# Patient Record
Sex: FEMALE | Race: OTHER | HISPANIC OR LATINO | Employment: FULL TIME | URBAN - METROPOLITAN AREA
[De-identification: names, ages, dates, MRNs, and addresses within clinical notes are randomized per-mention and may not be internally consistent; named-entity substitution may affect disease eponyms.]

---

## 2024-09-17 ENCOUNTER — OFFICE VISIT (OUTPATIENT)
Dept: URGENT CARE | Facility: CLINIC | Age: 39
End: 2024-09-17
Payer: COMMERCIAL

## 2024-09-17 VITALS
HEART RATE: 61 BPM | BODY MASS INDEX: 22.97 KG/M2 | SYSTOLIC BLOOD PRESSURE: 123 MMHG | RESPIRATION RATE: 18 BRPM | OXYGEN SATURATION: 98 % | TEMPERATURE: 98 F | DIASTOLIC BLOOD PRESSURE: 77 MMHG | WEIGHT: 117 LBS | HEIGHT: 60 IN

## 2024-09-17 DIAGNOSIS — S93.509A SPRAIN OF TOE, INITIAL ENCOUNTER: Primary | ICD-10-CM

## 2024-09-17 PROCEDURE — 99213 OFFICE O/P EST LOW 20 MIN: CPT | Mod: S$GLB,,, | Performed by: INTERNAL MEDICINE

## 2024-09-17 RX ORDER — HYDROQUINONE 40 MG/G
CREAM TOPICAL 2 TIMES DAILY
COMMUNITY
Start: 2024-07-17

## 2024-09-17 NOTE — PROGRESS NOTES
Subjective:      Patient ID: Elizabeth Cruz is a 38 y.o. female.    Vitals:  height is 5' (1.524 m) and weight is 53.1 kg (117 lb). Her oral temperature is 98.2 °F (36.8 °C). Her blood pressure is 123/77 and her pulse is 61. Her respiration is 18 and oxygen saturation is 98%.     Chief Complaint: Toe Injury    Patient is here for left index toe injury   Patient was walking when toe was bend the wrong way    Toe Injury  This is a new problem. The current episode started yesterday. The problem occurs constantly. The problem has been unchanged. Pertinent negatives include no abdominal pain, anorexia, arthralgias, change in bowel habit, chest pain, chills, congestion, coughing, diaphoresis, fatigue, fever, headaches, joint swelling, myalgias, nausea, neck pain, numbness, rash, sore throat, swollen glands, urinary symptoms, vertigo, visual change, vomiting or weakness. The symptoms are aggravated by walking and standing. She has tried nothing for the symptoms. The treatment provided no relief.       Constitution: Negative for chills, sweating, fatigue and fever.   HENT:  Negative for congestion and sore throat.    Neck: Negative for neck pain.   Cardiovascular:  Negative for chest pain.   Respiratory:  Negative for cough.    Gastrointestinal:  Negative for abdominal pain, nausea and vomiting.   Musculoskeletal:  Negative for joint pain, joint swelling and muscle ache.   Skin:  Negative for rash.   Neurological:  Negative for history of vertigo, headaches and numbness.      Objective:     Physical Exam    Left index toe with some pain with extension. No swelling, no deformity. Pulses and sensation normal.   Assessment:     1. Sprain of toe, initial encounter        Plan:       Sprain of toe, initial encounter  -     X-Ray Toe 2 View; Future; Expected date: 09/17/2024  -     Walking Boot For Home Use

## 2024-09-18 ENCOUNTER — TELEPHONE (OUTPATIENT)
Dept: URGENT CARE | Facility: CLINIC | Age: 39
End: 2024-09-18
Payer: COMMERCIAL

## 2024-09-18 NOTE — TELEPHONE ENCOUNTER
Patient called to find out x-ray resylts. Discussed negative fracture; treat as sprain of left 2nd toe with RICE. Recommend to es tape toe.  Recommend OTC ibuprofen 600 to 800 mg every 6 to 8 hours prn  pain.  Pt declined prescription.Pt states it hurts when she walks. She is using a walking boot.               X-Ray Toe 2 View    Result Date: 9/17/2024  EXAMINATION: XR TOE 2 VIEW CLINICAL HISTORY: Left second toe pain;  Unspecified sprain of unspecified toe(s), initial encounter TECHNIQUE: AP, lateral, oblique radiographs of the left 2nd toe. COMPARISON: None FINDINGS: There is no acute fracture or dislocation.  Alignment is normal.  Joint spaces are preserved.  Soft tissues are unremarkable.     No acute osseous abnormality. Electronically signed by: Conrad Bhatt Date:    09/17/2024 Time:    19:41

## 2024-10-07 ENCOUNTER — OFFICE VISIT (OUTPATIENT)
Dept: URGENT CARE | Facility: CLINIC | Age: 39
End: 2024-10-07
Payer: COMMERCIAL

## 2024-10-07 VITALS
OXYGEN SATURATION: 97 % | HEART RATE: 79 BPM | HEIGHT: 60 IN | TEMPERATURE: 98 F | RESPIRATION RATE: 18 BRPM | SYSTOLIC BLOOD PRESSURE: 100 MMHG | DIASTOLIC BLOOD PRESSURE: 68 MMHG | WEIGHT: 119.06 LBS | BODY MASS INDEX: 23.37 KG/M2

## 2024-10-07 DIAGNOSIS — M54.50 ACUTE BILATERAL LOW BACK PAIN WITHOUT SCIATICA: ICD-10-CM

## 2024-10-07 DIAGNOSIS — R53.83 FATIGUE, UNSPECIFIED TYPE: ICD-10-CM

## 2024-10-07 DIAGNOSIS — R50.9 FEVER, UNSPECIFIED FEVER CAUSE: ICD-10-CM

## 2024-10-07 DIAGNOSIS — R09.81 NASAL CONGESTION: ICD-10-CM

## 2024-10-07 DIAGNOSIS — J01.90 ACUTE BACTERIAL RHINOSINUSITIS: Primary | ICD-10-CM

## 2024-10-07 DIAGNOSIS — B96.89 ACUTE BACTERIAL RHINOSINUSITIS: Primary | ICD-10-CM

## 2024-10-07 LAB
BILIRUBIN, UA POC OHS: ABNORMAL
BLOOD, UA POC OHS: ABNORMAL
CLARITY, UA POC OHS: CLEAR
COLOR, UA POC OHS: YELLOW
CTP QC/QA: YES
CTP QC/QA: YES
GLUCOSE, UA POC OHS: NEGATIVE
KETONES, UA POC OHS: 15
LEUKOCYTES, UA POC OHS: NEGATIVE
NITRITE, UA POC OHS: NEGATIVE
PH, UA POC OHS: 5.5
POC MOLECULAR INFLUENZA A AGN: NEGATIVE
POC MOLECULAR INFLUENZA B AGN: NEGATIVE
PROTEIN, UA POC OHS: NEGATIVE
SARS-COV-2 AG RESP QL IA.RAPID: NEGATIVE
SPECIFIC GRAVITY, UA POC OHS: 1.02
UROBILINOGEN, UA POC OHS: 0.2

## 2024-10-07 PROCEDURE — 87502 INFLUENZA DNA AMP PROBE: CPT | Mod: QW,S$GLB,,

## 2024-10-07 PROCEDURE — 81003 URINALYSIS AUTO W/O SCOPE: CPT | Mod: QW,S$GLB,,

## 2024-10-07 PROCEDURE — 99214 OFFICE O/P EST MOD 30 MIN: CPT | Mod: S$GLB,,,

## 2024-10-07 PROCEDURE — 87811 SARS-COV-2 COVID19 W/OPTIC: CPT | Mod: QW,S$GLB,,

## 2024-10-07 RX ORDER — AMOXICILLIN AND CLAVULANATE POTASSIUM 875; 125 MG/1; MG/1
1 TABLET, FILM COATED ORAL EVERY 12 HOURS
Qty: 14 TABLET | Refills: 0 | Status: SHIPPED | OUTPATIENT
Start: 2024-10-07 | End: 2024-10-14

## 2024-10-07 RX ORDER — LEVOCETIRIZINE DIHYDROCHLORIDE 5 MG/1
5 TABLET, FILM COATED ORAL NIGHTLY
Qty: 30 TABLET | Refills: 0 | Status: SHIPPED | OUTPATIENT
Start: 2024-10-07

## 2024-10-07 RX ORDER — FLUTICASONE PROPIONATE 50 MCG
1 SPRAY, SUSPENSION (ML) NASAL DAILY
Qty: 11.1 ML | Refills: 0 | Status: SHIPPED | OUTPATIENT
Start: 2024-10-07

## 2024-10-07 NOTE — PATIENT INSTRUCTIONS
Augmentin twice daily for 7 days.  Flonase and Xyzal daily for your sinus.  Increase your fluid intake.    ¿Qué cuidados se necesitan en casa?   Llame a cook médico habitual para comunicarle que estuvo en el servicio de emergencias. Programe lissa polo de seguimiento si así se lo pidieron.  Intente aligerar la mucosidad.  Anable muchos líquidos para mantenerse hidratado.  Use un humidificador de aire húmedo para evitar el aire seco.  Aplique gotas nasales tran o lissa solución nasal salina para aliviar la congestión.  Lávese las joselo con frecuencia. Nesco ayudará a mantener saludables a los demás.  No fume o esté en lugares con humo. Dia cosas que podrían causarle problemas respiratorios fabby los gases, la contaminación, el polvo y otros alérgenos comunes.  Puede mauricio medicamentos fabby ibuprofeno, naproxeno o paracetamol para ayudarlo a aliviar el dolor.  Call for Emergency Help   Regrese al servicio de emergencias si:   Tiene rigidez en el shon, especialmente si también tiene fiebre, escalofríos, vómitos o dolor de zita intenso.  Tiene problemas para pensar con claridad.  Tiene problemas para reid o tiene visión doble.  Tiene hinchazón, enrojecimiento o dolor alrededor de elsa o ambos ojos.  Tiene fiebre de 38,9 °C (102 °F) o más, escalofríos con temblor o sudores.  ¿Cuándo jeremy llamar al médico?   Tiene malestar estomacal y vómitos.  Tiene más dolor en la alonzo y la zita.  No mejora en 1 o 2 semanas.  Tiene síntomas nuevos o que empeoran.  - Rest.    - Drink plenty of fluids.    - Acetaminophen (tylenol) or Ibuprofen (advil,motrin) as directed as needed for fever/pain. Avoid tylenol if you have a history of liver disease. Do not take ibuprofen if you have a history of GI bleeding, kidney disease, or if you take blood thinners.     - Follow up with your PCP or specialty clinic as directed in the next 1-2 weeks if not improved or as needed.  You can call [phone] to schedule an appointment with the  appropriate provider.    - Go to the ER or seek medical attention immediately if you develop new or worsening symptoms.     - You must understand that you have received an Urgent Care treatment only and that you may be released before all of your medical problems are known or treated.   - You, the patient, will arrange for follow up care as instructed.   - If your condition worsens or fails to improve we recommend that you receive another evaluation at the ER immediately or contact your PCP to discuss your concerns or return here.

## 2024-10-07 NOTE — PROGRESS NOTES
Subjective:      Patient ID: Elizabeth Cruz is a 38 y.o. female.    Vitals:  height is 5' (1.524 m) and weight is 54 kg (119 lb 0.8 oz). Her oral temperature is 97.9 °F (36.6 °C). Her blood pressure is 100/68 and her pulse is 79. Her respiration is 18 and oxygen saturation is 97%.     Chief Complaint: Generalized Body Aches    38-year-old female patient presents with chills, body aches, nasal congestion, fever, sinus pain, pressure x4 days.  She states she has been having a fever every day but resolves after taking advil. Denies CP, SOB, GI complaints, or any other associated symptoms.         Fever   This is a new problem. The current episode started 3 days ago. The problem occurs cycles. The problem has been unchanged. She has not experienced a heat injury.Her temperature was unmeasured prior to arrival. Associated symptoms include congestion, headaches and muscle aches. Pertinent negatives include no abdominal pain, chest pain, coughing, ear pain, nausea, rash, sore throat, vomiting or wheezing.       Constitution: Positive for chills and fever. Negative for activity change, appetite change, sweating, fatigue and unexpected weight change.   HENT:  Positive for congestion, sinus pain and sinus pressure. Negative for ear pain, ear discharge, foreign body in ear, tinnitus, hearing loss, facial swelling, facial trauma, nosebleeds, foreign body in nose, postnasal drip, sore throat, trouble swallowing and voice change.    Neck: Negative for neck pain, neck stiffness and painful lymph nodes.   Cardiovascular:  Negative for chest trauma, chest pain, leg swelling and palpitations.   Eyes:  Negative for eye trauma, foreign body in eye, eye discharge, eye itching, eye pain and eye redness.   Respiratory:  Negative for sleep apnea, chest tightness, cough, sputum production, bloody sputum, COPD, shortness of breath, stridor, wheezing and asthma.    Gastrointestinal:  Negative for abdominal trauma, abdominal pain,  abdominal bloating, history of abdominal surgery, nausea, vomiting and constipation.   Endocrine: hair loss, cold intolerance, heat intolerance and excessive thirst.   Genitourinary:  Negative for dysuria, frequency, urgency, urine decreased and flank pain.   Musculoskeletal:  Negative for pain, trauma, joint pain, joint swelling, abnormal ROM of joint, arthritis and gout.   Skin:  Negative for color change, pale, rash, wound and abrasion.   Allergic/Immunologic: Negative for environmental allergies, seasonal allergies, food allergies, eczema and asthma.   Neurological:  Positive for headaches. Negative for dizziness, history of vertigo, light-headedness, passing out, facial drooping, history of migraines, disorientation and altered mental status.   Hematologic/Lymphatic: Negative for swollen lymph nodes, easy bruising/bleeding, trouble clotting and history of blood clots. Does not bruise/bleed easily.   Psychiatric/Behavioral:  Negative for altered mental status, disorientation, confusion, agitation, nervous/anxious and sleep disturbance. The patient is not nervous/anxious.       Objective:     Physical Exam   Constitutional: She is oriented to person, place, and time. She appears well-developed. She is cooperative.  Non-toxic appearance. She does not appear ill. No distress.   HENT:   Head: Normocephalic and atraumatic.   Ears:   Right Ear: Hearing, tympanic membrane, external ear and ear canal normal.   Left Ear: Hearing, tympanic membrane, external ear and ear canal normal.   Nose: Congestion present. No mucosal edema, rhinorrhea or nasal deformity. No epistaxis. Right sinus exhibits frontal sinus tenderness. Right sinus exhibits no maxillary sinus tenderness. Left sinus exhibits frontal sinus tenderness. Left sinus exhibits no maxillary sinus tenderness.   Mouth/Throat: Uvula is midline, oropharynx is clear and moist and mucous membranes are normal. No trismus in the jaw. Normal dentition. No uvula swelling. No  oropharyngeal exudate, posterior oropharyngeal edema or posterior oropharyngeal erythema.   Eyes: Conjunctivae and lids are normal. No scleral icterus.   Neck: Trachea normal and phonation normal. Neck supple. No edema present. No erythema present. No neck rigidity present.   Cardiovascular: Normal rate, regular rhythm, normal heart sounds and normal pulses.   Pulmonary/Chest: Effort normal and breath sounds normal. No stridor. No respiratory distress. She has no decreased breath sounds. She has no wheezes. She has no rhonchi. She has no rales. She exhibits no tenderness.   Abdominal: Normal appearance and bowel sounds are normal. She exhibits no distension and no mass. Soft. There is no abdominal tenderness. There is no rebound, no guarding, no left CVA tenderness and no right CVA tenderness. No hernia.   Musculoskeletal: Normal range of motion.         General: No deformity. Normal range of motion.   Neurological: She is alert and oriented to person, place, and time. She exhibits normal muscle tone. Coordination normal.   Skin: Skin is warm, dry, intact, not diaphoretic and not pale.   Psychiatric: Her speech is normal and behavior is normal. Judgment and thought content normal.   Nursing note and vitals reviewed.    Results for orders placed or performed in visit on 10/07/24   SARS Coronavirus 2 Antigen, POCT Manual Read    Collection Time: 10/07/24 11:37 AM   Result Value Ref Range    SARS Coronavirus 2 Antigen Negative Negative     Acceptable Yes    POCT Influenza A/B MOLECULAR    Collection Time: 10/07/24 11:38 AM   Result Value Ref Range    POC Molecular Influenza A Ag Negative Negative    POC Molecular Influenza B Ag Negative Negative     Acceptable Yes    POCT Urinalysis(Instrument)    Collection Time: 10/07/24 11:55 AM   Result Value Ref Range    Color, POC UA Yellow Yellow, Straw, Colorless    Clarity, POC UA Clear Clear    Glucose, POC UA Negative Negative    Bilirubin, POC  UA Small (A) Negative    Ketones, POC UA 15 (A) Negative    Spec Grav POC UA 1.020 1.005 - 1.030    Blood, POC UA Moderate (A) Negative    pH, POC UA 5.5 5.0 - 8.0    Protein, POC UA Negative Negative    Urobilinogen, POC UA 0.2 <=1.0    Nitrite, POC UA Negative Negative    WBC, POC UA Negative Negative        Assessment:     1. Acute bacterial rhinosinusitis    2. Fever, unspecified fever cause    3. Acute bilateral low back pain without sciatica    4. Fatigue, unspecified type    5. Nasal congestion        Plan:       Acute bacterial rhinosinusitis  -     amoxicillin-clavulanate 875-125mg (AUGMENTIN) 875-125 mg per tablet; Take 1 tablet by mouth every 12 (twelve) hours. for 7 days  Dispense: 14 tablet; Refill: 0    Fever, unspecified fever cause  -     SARS Coronavirus 2 Antigen, POCT Manual Read  -     POCT Influenza A/B MOLECULAR    Acute bilateral low back pain without sciatica  -     POCT Urinalysis(Instrument)    Fatigue, unspecified type    Nasal congestion  -     fluticasone propionate (FLONASE) 50 mcg/actuation nasal spray; 1 spray (50 mcg total) by Each Nostril route once daily.  Dispense: 11.1 mL; Refill: 0  -     levocetirizine (XYZAL) 5 MG tablet; Take 1 tablet (5 mg total) by mouth every evening.  Dispense: 30 tablet; Refill: 0             Additional MDM:     Heart Failure Score:   COPD = No